# Patient Record
Sex: MALE | URBAN - METROPOLITAN AREA
[De-identification: names, ages, dates, MRNs, and addresses within clinical notes are randomized per-mention and may not be internally consistent; named-entity substitution may affect disease eponyms.]

---

## 2019-01-18 ENCOUNTER — TELEPHONE (OUTPATIENT)
Dept: NEUROLOGY | Facility: HOSPITAL | Age: 49
End: 2019-01-18

## 2019-01-18 NOTE — TELEPHONE ENCOUNTER
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  Patient had previously prostate cancer.  Had radical prostectomy in 2017.    Per the pathology report his Ki-67 is 95%.  He is currently inpatient at [facility].    He is on dialysis and had half a dose of chemo, that made him very ill and they ended up admitting him.     MRI has been done as well as other imaging.  Patient's wife Camelia is sending documentation and has requested the hospital send records as well.  She has sent Path and MRI, those are scanned into patient's chart.    She can be reached at [phone].

## 2019-01-18 NOTE — TELEPHONE ENCOUNTER
"Spoke to patient's wife and brother.  They state the patient is not able to be discharged, and to see Dr. Lu would need to be a transfer admit. 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  I explained we can not request an inpatient transfer for a patient not seen before. If he is not stable that is not recommended.     I let them know that we will request his information.  I will also send orders for an FDG PET in case he is able to be discharged.  They feel they are at a loss as to what can be done at the [facility].      They asked if Dr. Lu had any trials for high grade NEC and I told them I was unsure.  The only thing would be to send off tissue to [facility], but that would be up to his local doctors.  They asked about vaccines or other "non-traditional" treatments or trials. I suggested [facility] for such trials if they have no luck anywhere else.     Patient also has calls to Dr. Pascual and [facility] to see if anyone will accept him as an inpatient transfer.  I let them know we will still request his information, I will email orders and an BLADIMIR along with general information about our group in case he gets to a point he can have an outpatient visit.     "

## 2019-01-21 ENCOUNTER — TELEPHONE (OUTPATIENT)
Dept: NEUROLOGY | Facility: HOSPITAL | Age: 49
End: 2019-01-21